# Patient Record
Sex: FEMALE | URBAN - NONMETROPOLITAN AREA
[De-identification: names, ages, dates, MRNs, and addresses within clinical notes are randomized per-mention and may not be internally consistent; named-entity substitution may affect disease eponyms.]

---

## 2017-01-05 ENCOUNTER — APPOINTMENT (RX ONLY)
Dept: URBAN - NONMETROPOLITAN AREA CLINIC 31 | Facility: CLINIC | Age: 22
Setting detail: DERMATOLOGY
End: 2017-01-05

## 2017-01-05 VITALS — DIASTOLIC BLOOD PRESSURE: 70 MMHG | WEIGHT: 130 LBS | SYSTOLIC BLOOD PRESSURE: 114 MMHG | HEIGHT: 66 IN

## 2017-01-05 DIAGNOSIS — L259 CONTACT DERMATITIS AND OTHER ECZEMA, UNSPECIFIED CAUSE: ICD-10-CM

## 2017-01-05 PROBLEM — F41.9 ANXIETY DISORDER, UNSPECIFIED: Status: ACTIVE | Noted: 2017-01-05

## 2017-01-05 PROBLEM — L30.8 OTHER SPECIFIED DERMATITIS: Status: ACTIVE | Noted: 2017-01-05

## 2017-01-05 PROCEDURE — 87220 TISSUE EXAM FOR FUNGI: CPT

## 2017-01-05 PROCEDURE — ? PATIENT SPECIFIC COUNSELING

## 2017-01-05 PROCEDURE — 99202 OFFICE O/P NEW SF 15 MIN: CPT

## 2017-01-05 PROCEDURE — ? KOH PREP

## 2017-01-05 PROCEDURE — ? PRESCRIPTION

## 2017-01-05 PROCEDURE — ? COUNSELING

## 2017-01-05 RX ORDER — TRIAMCINOLONE ACETONIDE 1 MG/G
1 CREAM TOPICAL BID
Qty: 1 | Refills: 2 | Status: ERX | COMMUNITY
Start: 2017-01-05

## 2017-01-05 RX ORDER — KETOCONAZOLE 20 MG/G
1 CREAM TOPICAL
Qty: 1 | Refills: 3 | Status: ERX | COMMUNITY
Start: 2017-01-05

## 2017-01-05 RX ADMIN — TRIAMCINOLONE ACETONIDE 1: 1 CREAM TOPICAL at 21:48

## 2017-01-05 RX ADMIN — KETOCONAZOLE 1: 20 CREAM TOPICAL at 21:48

## 2017-01-05 ASSESSMENT — LOCATION SIMPLE DESCRIPTION DERM
LOCATION SIMPLE: LEFT ANTERIOR NECK
LOCATION SIMPLE: LEFT LOWER BACK
LOCATION SIMPLE: RIGHT LOWER BACK
LOCATION SIMPLE: RIGHT ANTERIOR NECK

## 2017-01-05 ASSESSMENT — LOCATION DETAILED DESCRIPTION DERM
LOCATION DETAILED: LEFT INFERIOR MEDIAL MIDBACK
LOCATION DETAILED: LEFT INFERIOR ANTERIOR NECK
LOCATION DETAILED: RIGHT SUPERIOR ANTERIOR NECK
LOCATION DETAILED: RIGHT INFERIOR MEDIAL MIDBACK

## 2017-01-05 ASSESSMENT — LOCATION ZONE DERM
LOCATION ZONE: NECK
LOCATION ZONE: TRUNK

## 2017-01-05 NOTE — PROCEDURE: PATIENT SPECIFIC COUNSELING
Detail Level: Zone
We dscussed performing a true test on patient to test 36 most common allergens but patient denies other allergens other than metal. I suggested patient ordering a nickel testing kit online.  KOH prep did not reveal fungus however due to clinical appearance recommended patient apply Ketoconozole cream on neck daily for two weeks and then call clinic if not improved for hydrocortisone script.  Recommended triamcinolone to rash on lower back bid for two weeks as needed for itchy rash.

## 2017-01-05 NOTE — PROCEDURE: KOH PREP
Koh Intro Text (From The.....): A KOH prep was ordered and evaluated from the
Add  To Bill: No
Detail Level: Detailed
Showing: no pathologic findings

## 2017-01-05 NOTE — PROCEDURE: MIPS QUALITY
Detail Level: Detailed
Quality 226: Preventive Care And Screening: Tobacco Use: Screening And Cessation Intervention: Patient screened for tobacco and is an ex-smoker
Quality 431: Preventive Care And Screening: Unhealthy Alcohol Use - Screening: Patient screened for unhealthy alcohol use using a single question and scores less than 2 times per year
Quality 110: Preventive Care And Screening: Influenza Immunization: Influenza immunization was not ordered or administered, reason not given
Quality 111:Pneumonia Vaccination Status For Older Adults: Pneumococcal Vaccination not Administered or Previously Received, Reason not Otherwise Specified
Quality 130: Documentation Of Current Medications In The Medical Record: Current Medications not Documented, Patient not Eligible

## 2017-01-18 ENCOUNTER — APPOINTMENT (RX ONLY)
Dept: URBAN - NONMETROPOLITAN AREA CLINIC 31 | Facility: CLINIC | Age: 22
Setting detail: DERMATOLOGY
End: 2017-01-18

## 2017-01-18 VITALS — SYSTOLIC BLOOD PRESSURE: 114 MMHG | HEIGHT: 56 IN | WEIGHT: 130 LBS | DIASTOLIC BLOOD PRESSURE: 72 MMHG

## 2017-01-18 DIAGNOSIS — L30.0 NUMMULAR DERMATITIS: ICD-10-CM

## 2017-01-18 PROBLEM — L85.3 XEROSIS CUTIS: Status: ACTIVE | Noted: 2017-01-18

## 2017-01-18 PROCEDURE — ? PATIENT SPECIFIC COUNSELING

## 2017-01-18 PROCEDURE — 99213 OFFICE O/P EST LOW 20 MIN: CPT

## 2017-01-18 NOTE — PROCEDURE: MIPS QUALITY
Detail Level: Detailed
Quality 226: Preventive Care And Screening: Tobacco Use: Screening And Cessation Intervention: Patient screened for tobacco and is an ex-smoker
Quality 431: Preventive Care And Screening: Unhealthy Alcohol Use - Screening: Patient screened for unhealthy alcohol use using a single question and scores less than 2 times per year
Quality 130: Documentation Of Current Medications In The Medical Record: Current Medications Documented

## 2017-01-18 NOTE — PROCEDURE: PATIENT SPECIFIC COUNSELING
Detail Level: Zone
Patient will use triamcinolone cream she has at home BID on neck X 10 days. Patient noticed rash when she got home from [place]. Rash has been visible for around 3 months.